# Patient Record
Sex: FEMALE | Race: WHITE | NOT HISPANIC OR LATINO | URBAN - NONMETROPOLITAN AREA
[De-identification: names, ages, dates, MRNs, and addresses within clinical notes are randomized per-mention and may not be internally consistent; named-entity substitution may affect disease eponyms.]

---

## 2017-12-08 NOTE — PROCEDURE NOTE: CLINICAL
PROCEDURE NOTE: A/C Paracentesis, Therapeutic OD. Diagnosis: Glaucoma Associated with Ocular Inflammation, Mild. Prior to procedure, risks/benefits/alternatives discussed including infection, loss of vision, hemorrhage, cataract, retinal tears or detachment and patient wished to proceed. All questions asked by the patient were answered in detail. Betadine prep was performed. Location of Paracentesis: Temporal Limbus. Volume of tap: 0.* ml. Patient tolerated procedure well. There were no complications. Anti-biotic drops were then instilled into the post-operative eye. Post procedure IOP = * mmHg. Post procedure instructions given. Patient given office phone number/answering service number and advised to call immediately should there be an increase in floaters or redness, loss of vision or pain, or should they have any other questions or concerns. Morro Petty

## 2017-12-08 NOTE — PATIENT DISCUSSION
Associated with DECREASED VISION and/or INCREASED PRESSURE, recommend VITRECTOMY with REMOVAL of RETIANED LENS FRAGMENTS to restore and preserve vision and to treat and/or prevent inflammation, pressure elevation and/or CME. Reviewed potential benefits, alternatives, realistic expectations and risks of surgery including change in refraction, infection, bleeding, retinal detachment, optic neuropathy, loss of vision, blindness, and loss of eye. After discussion patient would like to proceed with the Procedure.

## 2017-12-08 NOTE — PATIENT DISCUSSION
IOP 50 - REVIEWED WITH DR MIRZA WHO REMOVED MOST OF VISCOELASTIC AT END OF CASE - PT TREATED DIAMOX 500 MG AND COMBIGAN AND LUMIGAN DROPS - NO IMPROVEMENT IN IOP AFTER 20 MINUTES  - IOP 50 - PARACENTESIS DONE BY PRESSING WITH JEWLERS FORCEPS ON POSTERIOR WOUND MARGIN AFTER POVIDONE IODINE AND FLUID RELEASED.   AFTER THIS IOP WAS 10.  TO RECHECK IN 30 MINUTES.

## 2017-12-09 NOTE — PROCEDURE NOTE: CLINICAL
PROCEDURE NOTE: Bandage Contact Lens OD. Diagnosis: Corneal Abrasion. Prior to treatment, the risks/benefits/alternatives were discussed. The patient wished to proceed with procedure. A topical anesthetic, proparacaine, was administered. A bandage contact lens was fitted for treatment of ocular surface disease. Night and day contact lens (-0.00, 13.8, 8.6), fitting and placement done without complication. Expiration date: *. Lot #: *. Patient tolerated procedure well. There were no complications. Post procedure instructions given. The patient tolerated the procedure well and left in good condition. Patient is instructed to make sure it is removed in 24 hours, either here, at home, or by another eye doctor. Wesley Prabhakar

## 2017-12-10 NOTE — PROCEDURE NOTE: CLINICAL
PROCEDURE NOTE: A/C Paracentesis, Therapeutic OD. Diagnosis: Glaucoma Associated with Ocular Inflammation, Mild. Prior to procedure, risks/benefits/alternatives discussed including infection, loss of vision, hemorrhage, cataract, retinal tears or detachment and patient wished to proceed. All questions asked by the patient were answered in detail. Betadine prep was performed. Location of Paracentesis: Temporal Limbus. Volume of tap: 0.1* ml. Patient tolerated procedure well. There were no complications. Anti-biotic drops were then instilled into the post-operative eye. Post procedure IOP = 5* mmHg. Post procedure instructions given. Patient given office phone number/answering service number and advised to call immediately should there be an increase in floaters or redness, loss of vision or pain, or should they have any other questions or concerns. Ana Cristina Pascual PROCEDURE NOTE: Bandage Contact Lens OD. Diagnosis: Corneal Abrasion. Prior to treatment, the risks/benefits/alternatives were discussed. The patient wished to proceed with procedure. A topical anesthetic, proparacaine, was administered. A bandage contact lens was fitted for treatment of ocular surface disease. Night and day contact lens (-0.00, 13.8, 8.6), fitting and placement done without complication. Expiration date: *. Lot #: *. Patient tolerated procedure well. There were no complications. Post procedure instructions given. The patient tolerated the procedure well and left in good condition. Patient is instructed to make sure it is removed in 24 hours, either here, at home, or by another eye doctor. Ana Cristina Pascual

## 2017-12-12 NOTE — PATIENT DISCUSSION
IOP 50 ON 12 17 - REVIEWED WITH DR MIRZA WHO REMOVED MOST OF VISCOELASTIC AT END OF CASE - PT TREATED DIAMOX 500 MG AND COMBIGAN AND LUMIGAN DROPS - NO IMPROVEMENT IN IOP AFTER 20 MINUTES  - IOP 50 - PARACENTESIS DONE BY PRESSING WITH JEWLERS FORCEPS ON POSTERIOR WOUND MARGIN AFTER POVIDONE IODINE AND FLUID RELEASED.   AFTER THIS IOP WAS 10.  TO RECHECK IN 30 MINUTES.

## 2017-12-22 NOTE — PATIENT DISCUSSION
SMALL CORTICLE FRAGMENT AT 6:00 IN AC - SHOWING SLOW ABSORPTION - IF STILL PRESENT WHEN DR WILLIS PUTS 2ND IOL IN CAN BE REMOVED.

## 2018-01-18 PROBLEM — H01.015: Noted: 2018-01-18

## 2018-01-18 PROBLEM — H01.012: Noted: 2018-01-18

## 2018-01-18 PROBLEM — H25.13: Noted: 2018-01-18

## 2018-06-11 NOTE — PROCEDURE NOTE: CLINICAL
PROCEDURE NOTE: Sub-Tenon’s Kenalog* OD. Diagnosis: Cystoid Macular Degeneration. Anesthesia: Topical/Subconjunctival. Prep: Betadine Flush. Prior to injection, risks/benefits/alternatives discussed including infection, loss of vision, hemorrhage, cataract, glaucoma, elevated intraocular pressure and lid swelling. Betadine prep was performed. Sub-Tenon’s injection of Kenalog 40 mg/1 ml was given. The remainder of the Sub-Tenon’s kenalog was discarded in the medical waste. Time of injection: 2:05 PM. Patient tolerated procedure well. There were no complications. Post-op instructions given. Patient given office phone number/answering service number and advised to call immediately should there be an increase in floaters or redness, loss of vision or pain, or should they have any other questions or concerns. Abby Conner

## 2019-08-28 ENCOUNTER — IMPORTED ENCOUNTER (OUTPATIENT)
Dept: URBAN - NONMETROPOLITAN AREA CLINIC 1 | Facility: CLINIC | Age: 64
End: 2019-08-28

## 2019-08-28 PROCEDURE — 92014 COMPRE OPH EXAM EST PT 1/>: CPT

## 2019-08-28 NOTE — PATIENT DISCUSSION
Blepharitis-Discussed with pt.-Recommend pt begin warm compresses and lid scrubs BID. Pt instructed on proper lid scrub technique. IMPROVED WITH RXD/C MAXITROLCataract(s)-Visually significant.-Cataract(s) causing symptomatic impairment of visual function not correctable with a tolerable change in glasses or contact lenses lighting or non-operative means resulting in specific activity limitations and/or participation restrictions including but not limited to reading viewing television driving or meeting vocational or recreational needs. -Expectation is clearer vision and reduced glare disability after cataract removal.-Refer to Dr Caitlin Preciado for cataract evaluationpt to call when ready

## 2019-11-01 ENCOUNTER — IMPORTED ENCOUNTER (OUTPATIENT)
Dept: URBAN - NONMETROPOLITAN AREA CLINIC 1 | Facility: CLINIC | Age: 64
End: 2019-11-01

## 2019-11-01 PROBLEM — H01.015: Noted: 2019-11-01

## 2019-11-01 PROBLEM — H01.012: Noted: 2019-11-01

## 2019-11-01 PROBLEM — H25.13: Noted: 2019-11-01

## 2019-11-01 PROCEDURE — 92014 COMPRE OPH EXAM EST PT 1/>: CPT

## 2019-11-01 NOTE — PATIENT DISCUSSION
Cataract(s)-Visually significant cataract OU.-Cataract(s) causing symptomatic impairment of visual function not correctable with a tolerable change in glasses or contact lenses lighting or non-operative means resulting in specific activity limitations and/or participation restrictions including but not limited to reading viewing television driving or meeting vocational or recreational needs. -Expectation is clearer vision and functional improvement in symptoms as well as reduced glare disability after cataract removal.-Order IOLMaster and OPD today. -Recommend repeat testing based on today's OPD testing and lifestyle questionnaire.-All questions were answered regarding surgery including pre and post-op medications appointments activity restrictions and anesthetic usage.-The risks benefits and alternatives and special risk factors for the patient were discussed in detail including but not limited to: bleeding infection retinal detachment vitreous loss problems with the implant and possible need for additional surgery.-Although rare the possibility of complete vision loss was discussed.-The possible need for glasses post-operatively was discussed.-Order medical clearance exam based on history of -Patient elects to proceed with cataract surgery OD . Will schedule at patient's convenience and re-evaluate OS  in the future. **Doctor Recommendations**- Recommend starting AT's/Lotemax x 7 days and repeating karmen - Based on today's results: -- Discussed traditional surgery -- Discussed LenSx in detail with patient -- Discussed LRI OS Toric OD. Discussed the need for glasses. -- Discussed Panoptix OS in detail with patient and Panoptix Toric OD. Discussed the rings associated with this lens. Informed patient she will see rings around headlights/streelights/taillights at night while driving. Dermatochalasis/Ptosis OU:***Will re-evaluate Ptosis/Dermatochalasis 1 month after surgery. Will schedule Ptosis Eval/Testing after cataract surgery.

## 2020-01-25 PROBLEM — H01.015: Noted: 2020-01-25

## 2020-01-25 PROBLEM — H25.13: Noted: 2020-01-25

## 2020-01-25 PROBLEM — H01.012: Noted: 2020-01-25

## 2020-01-29 ENCOUNTER — IMPORTED ENCOUNTER (OUTPATIENT)
Dept: URBAN - NONMETROPOLITAN AREA CLINIC 1 | Facility: CLINIC | Age: 65
End: 2020-01-29

## 2020-01-29 NOTE — PATIENT DISCUSSION
Medical Clearance-Medical clearance done today by Rosibel De Paz MD. -No outstanding concerns that would preclude surgery.-Patient is cleared to proceed with scheduled surgery.

## 2020-02-12 ENCOUNTER — IMPORTED ENCOUNTER (OUTPATIENT)
Dept: URBAN - NONMETROPOLITAN AREA CLINIC 1 | Facility: CLINIC | Age: 65
End: 2020-02-12

## 2020-02-12 NOTE — PATIENT DISCUSSION
Medical Clearance-Medical clearance done today by Larissa Hemphill MD. -No outstanding concerns that would preclude surgery.-Patient is cleared to proceed with scheduled surgery.

## 2020-02-13 PROBLEM — H01.015: Noted: 2020-02-13

## 2020-02-13 PROBLEM — H25.13: Noted: 2020-02-13

## 2020-02-13 PROBLEM — H01.012: Noted: 2020-02-13

## 2020-02-14 ENCOUNTER — IMPORTED ENCOUNTER (OUTPATIENT)
Dept: URBAN - NONMETROPOLITAN AREA CLINIC 1 | Facility: CLINIC | Age: 65
End: 2020-02-14

## 2020-02-14 PROBLEM — H01.015: Noted: 2020-02-14

## 2020-02-14 PROBLEM — H25.13: Noted: 2020-02-14

## 2020-02-14 PROBLEM — Z98.41: Noted: 2020-02-14

## 2020-02-14 PROBLEM — H01.012: Noted: 2020-02-14

## 2020-02-14 NOTE — PATIENT DISCUSSION
s/p PCIOL LRI/LenSx IOL OD 2/13/20 JS-Pt doing well s/p PCIOL. -Continue post-op gtts according to instruction sheet and sleep with eye shield over eye for 7 nights.-Avoid bending at the waist lifting anything over 5lbs and dirty or zain environments. -RTC 1wk.

## 2020-02-19 ENCOUNTER — IMPORTED ENCOUNTER (OUTPATIENT)
Dept: URBAN - NONMETROPOLITAN AREA CLINIC 1 | Facility: CLINIC | Age: 65
End: 2020-02-19

## 2020-02-19 PROBLEM — Z01.818: Noted: 2020-02-19

## 2020-02-19 PROBLEM — H25.13: Noted: 2020-02-14

## 2020-02-19 PROCEDURE — 99024 POSTOP FOLLOW-UP VISIT: CPT

## 2020-02-19 NOTE — PATIENT DISCUSSION
Cataract(s)-Visually significant cataract OS . -Cataract(s) causing symptomatic impairment of visual function not correctable with a tolerable change in glasses or contact lenses lighting or non-operative means resulting in specific activity limitations and/or participation restrictions including but not limited to reading viewing television driving or meeting vocational or recreational needs. -Expectation is clearer vision and functional improvement in symptoms as well as reduced glare disability after cataract removal.-Recommend Lensx/Limbal Relaxing Incisions based on previous OPD testing and lifestyle questionnaire.-All questions were answered regarding surgery including pre and post-op medications appointments activity restrictions and anesthetic usage.-The risks benefits and alternatives and special risk factors for the patient were discussed in detail including but not limited to: bleeding infection retinal detachment vitreous loss problems with the implant and possible need for additional surgery.-Although rare the possibility of complete vision loss was discussed.-The need for glasses post-operatively was discussed.-Patient elects to proceed with cataract surgery OS . Will schedule at patient's convenience. s/p PCIOL-Pt doing well at 1 week s/p PCIOL. -Continue post-op gtts according to instruction sheet.-Okay to resume usual activites and d/c eye shield.

## 2020-02-28 ENCOUNTER — IMPORTED ENCOUNTER (OUTPATIENT)
Dept: URBAN - NONMETROPOLITAN AREA CLINIC 1 | Facility: CLINIC | Age: 65
End: 2020-02-28

## 2020-02-28 PROBLEM — Z98.42: Noted: 2020-02-28

## 2020-02-28 PROBLEM — H25.13: Noted: 2020-02-28

## 2020-02-28 PROBLEM — Z01.818: Noted: 2020-02-28

## 2020-02-28 NOTE — PATIENT DISCUSSION
s/p PCIOL LRI/Trad IOL OS 2/27/20 JS-Pt doing well s/p PCIOL. -Continue post-op gtts according to instruction sheet and sleep with eye shield over eye for 7 nights.-Avoid bending at the waist lifting anything over 5lbs and dirty or zain environments. -RTC 1wk.

## 2020-03-06 ENCOUNTER — IMPORTED ENCOUNTER (OUTPATIENT)
Dept: URBAN - NONMETROPOLITAN AREA CLINIC 1 | Facility: CLINIC | Age: 65
End: 2020-03-06

## 2020-06-12 ENCOUNTER — IMPORTED ENCOUNTER (OUTPATIENT)
Dept: URBAN - NONMETROPOLITAN AREA CLINIC 1 | Facility: CLINIC | Age: 65
End: 2020-06-12

## 2020-06-12 PROBLEM — Z96.1: Noted: 2020-06-12

## 2020-06-12 PROBLEM — H26.493: Noted: 2020-06-12

## 2020-06-12 PROCEDURE — 92014 COMPRE OPH EXAM EST PT 1/>: CPT

## 2020-06-12 NOTE — PATIENT DISCUSSION
s/p PCIOL-Stable PCIOL OU.-Monitor for PCO. Early PCO-Explained symptoms of advancing PCO. -Continue to monitor for now. Pt will notify us if any new symptoms develop.

## 2021-06-16 ENCOUNTER — IMPORTED ENCOUNTER (OUTPATIENT)
Dept: URBAN - NONMETROPOLITAN AREA CLINIC 1 | Facility: CLINIC | Age: 66
End: 2021-06-16

## 2021-06-16 PROCEDURE — 92014 COMPRE OPH EXAM EST PT 1/>: CPT

## 2022-04-10 ASSESSMENT — VISUAL ACUITY
OS_PH: 20/25
OD_CC: J2
OS_GLARE: 20/50
OS_AM: 20/25
OD_CC: J3
OS_SC: 20/40
OD_GLARE: 20/50
OS_CC: 20/30
OS_CC: 20/50
OS_PH: 20/30
OD_SC: 20/40
OS_GLARE: 20/50
OS_SC: 20/40
OD_PAM: 20/25
OS_CC: 20/20
OD_CC: 20/40-2
OD_CC: J3
OS_CC: J2
OD_PH: 20/30
OS_SC: 20/30-1
OD_SC: 20/40
OS_AM: 20/25
OD_SC: 20/70
OS_CC: J3
OS_CC: 20/40
OS_SC: 20/30
OD_CC: 20/40-1
OD_PH: 20/30
OD_CC: 20/30-1
OD_SC: 20/40
OS_CC: J2
OD_CC: 20/25+1

## 2022-04-10 ASSESSMENT — TONOMETRY
OS_IOP_MMHG: 11
OD_IOP_MMHG: 11
OS_IOP_MMHG: 16
OS_IOP_MMHG: 12
OD_IOP_MMHG: 11
OS_IOP_MMHG: 12
OD_IOP_MMHG: 12
OS_IOP_MMHG: 14
OD_IOP_MMHG: 12
OD_IOP_MMHG: 11
OS_IOP_MMHG: 12
OS_IOP_MMHG: 11
OD_IOP_MMHG: 14
OD_IOP_MMHG: 11
OD_IOP_MMHG: 16

## 2023-02-24 NOTE — PATIENT DISCUSSION
ARTIFICIAL TEARS to affected eye(s) as needed. Griseofulvin Pregnancy And Lactation Text: This medication is Pregnancy Category X and is known to cause serious birth defects. It is unknown if this medication is excreted in breast milk but breast feeding should be avoided.

## 2024-02-20 ENCOUNTER — COMPREHENSIVE EXAM (OUTPATIENT)
Dept: RURAL CLINIC 1 | Facility: CLINIC | Age: 69
End: 2024-02-20

## 2024-02-20 DIAGNOSIS — Z96.1: ICD-10-CM

## 2024-02-20 DIAGNOSIS — H26.493: ICD-10-CM

## 2024-02-20 PROCEDURE — 92015 DETERMINE REFRACTIVE STATE: CPT

## 2024-02-20 PROCEDURE — 92014 COMPRE OPH EXAM EST PT 1/>: CPT

## 2024-02-20 ASSESSMENT — VISUAL ACUITY
OD_PH: 20/30-2
OU_CC: 20/20
OD_CC: 20/50
OS_CC: 20/25

## 2024-02-20 ASSESSMENT — TONOMETRY
OD_IOP_MMHG: 12
OS_IOP_MMHG: 12